# Patient Record
Sex: FEMALE | Race: AMERICAN INDIAN OR ALASKA NATIVE
[De-identification: names, ages, dates, MRNs, and addresses within clinical notes are randomized per-mention and may not be internally consistent; named-entity substitution may affect disease eponyms.]

---

## 2019-08-26 LAB
BASOPHILS # (AUTO): 0 K/MM3 (ref 0–0.1)
BASOPHILS NFR BLD AUTO: 0.4 % (ref 0–1.8)
EOSINOPHIL # BLD AUTO: 0.1 K/MM3 (ref 0–0.4)
EOSINOPHIL NFR BLD AUTO: 0.8 % (ref 0–4.3)
HCT VFR BLD CALC: 40 % (ref 30.3–42.9)
HGB BLD-MCNC: 13.3 GM/DL (ref 10.1–14.3)
LYMPHOCYTES # BLD AUTO: 1.6 K/MM3 (ref 1.2–5.4)
LYMPHOCYTES NFR BLD AUTO: 21.6 % (ref 13.4–35)
MCHC RBC AUTO-ENTMCNC: 33 % (ref 30–34)
MCV RBC AUTO: 89 FL (ref 79–97)
MONOCYTES # (AUTO): 0.4 K/MM3 (ref 0–0.8)
MONOCYTES % (AUTO): 5.2 % (ref 0–7.3)
PLATELET # BLD: 189 K/MM3 (ref 140–440)
RBC # BLD AUTO: 4.52 M/MM3 (ref 3.65–5.03)

## 2019-08-26 NOTE — ANESTHESIA CONSULTATION
Anesthesia Consult and Med Hx


Date of service: 08/26/19





- Airway


Anesthetic Teeth Evaluation: Good, Partials


Mental/Hyoid Distance: Adequate


Mallampati Class: Class II


Intubation Access Assessment: Good





- Pulmonary Exam


CTA: Yes





- Cardiac Exam


Cardiac Exam: RRR





- Pre-Operative Health Status


ASA Pre-Surgery Classification: ASA1


Proposed Anesthetic Plan: General





- Pulmonary


Hx Smoking: No


Hx Asthma: No


COPD: No


Hx Pneumonia: No





- Cardiovascular System


Hx Hypertension: No





- Central Nervous System


Hx Seizures: No


Hx Psychiatric Problems: No





- Endocrine


Hx Renal Disease: No


Hx End Stage Renal Disease: No


Hx Hypothyroidism: No


Hx Hyperthyroidism: No





- Hematic


Hx Anemia: No


Hx Sickle Cell Disease: No





- Other Systems


Hx Alcohol Use: Yes (Occas)


Hx Cancer: No


Hx Obesity: No

## 2019-08-28 ENCOUNTER — HOSPITAL ENCOUNTER (OUTPATIENT)
Dept: HOSPITAL 5 - OR | Age: 34
Setting detail: OBSERVATION
LOS: 1 days | Discharge: HOME | End: 2019-08-29
Attending: OBSTETRICS & GYNECOLOGY | Admitting: OBSTETRICS & GYNECOLOGY
Payer: MEDICAID

## 2019-08-28 DIAGNOSIS — Z98.51: ICD-10-CM

## 2019-08-28 DIAGNOSIS — N94.10: ICD-10-CM

## 2019-08-28 DIAGNOSIS — R10.2: Primary | ICD-10-CM

## 2019-08-28 PROCEDURE — 58552 LAPARO-VAG HYST INCL T/O: CPT

## 2019-08-28 PROCEDURE — 85018 HEMOGLOBIN: CPT

## 2019-08-28 PROCEDURE — 86900 BLOOD TYPING SEROLOGIC ABO: CPT

## 2019-08-28 PROCEDURE — 88307 TISSUE EXAM BY PATHOLOGIST: CPT

## 2019-08-28 PROCEDURE — 84703 CHORIONIC GONADOTROPIN ASSAY: CPT

## 2019-08-28 PROCEDURE — 96376 TX/PRO/DX INJ SAME DRUG ADON: CPT

## 2019-08-28 PROCEDURE — 64450 NJX AA&/STRD OTHER PN/BRANCH: CPT

## 2019-08-28 PROCEDURE — 85025 COMPLETE CBC W/AUTO DIFF WBC: CPT

## 2019-08-28 PROCEDURE — A4217 STERILE WATER/SALINE, 500 ML: HCPCS

## 2019-08-28 PROCEDURE — 85014 HEMATOCRIT: CPT

## 2019-08-28 PROCEDURE — 86901 BLOOD TYPING SEROLOGIC RH(D): CPT

## 2019-08-28 PROCEDURE — 86850 RBC ANTIBODY SCREEN: CPT

## 2019-08-28 PROCEDURE — 96375 TX/PRO/DX INJ NEW DRUG ADDON: CPT

## 2019-08-28 PROCEDURE — 96374 THER/PROPH/DIAG INJ IV PUSH: CPT

## 2019-08-28 PROCEDURE — G0378 HOSPITAL OBSERVATION PER HR: HCPCS

## 2019-08-28 PROCEDURE — 36415 COLL VENOUS BLD VENIPUNCTURE: CPT

## 2019-08-28 RX ADMIN — FENTANYL CITRATE NR MCG: 50 INJECTION, SOLUTION INTRAMUSCULAR; INTRAVENOUS at 08:57

## 2019-08-28 RX ADMIN — DEXTROSE, SODIUM CHLORIDE, SODIUM LACTATE, POTASSIUM CHLORIDE, AND CALCIUM CHLORIDE SCH MLS/HR: 5; .6; .31; .03; .02 INJECTION, SOLUTION INTRAVENOUS at 22:20

## 2019-08-28 RX ADMIN — HYDROMORPHONE HYDROCHLORIDE PRN MG: 1 INJECTION, SOLUTION INTRAMUSCULAR; INTRAVENOUS; SUBCUTANEOUS at 13:20

## 2019-08-28 RX ADMIN — FENTANYL CITRATE NR MCG: 50 INJECTION, SOLUTION INTRAMUSCULAR; INTRAVENOUS at 09:00

## 2019-08-28 RX ADMIN — DEXTROSE, SODIUM CHLORIDE, SODIUM LACTATE, POTASSIUM CHLORIDE, AND CALCIUM CHLORIDE SCH MLS/HR: 5; .6; .31; .03; .02 INJECTION, SOLUTION INTRAVENOUS at 14:52

## 2019-08-28 RX ADMIN — KETOROLAC TROMETHAMINE SCH MG: 30 INJECTION, SOLUTION INTRAMUSCULAR at 17:43

## 2019-08-28 RX ADMIN — HYDROMORPHONE HYDROCHLORIDE PRN MG: 1 INJECTION, SOLUTION INTRAMUSCULAR; INTRAVENOUS; SUBCUTANEOUS at 13:30

## 2019-08-28 NOTE — OPERATIVE REPORT
Operative Report


Operative Report: 


Date of surgery: 2019





Preoperative diagnoses:, Pelvic pain; dyspareunia; dysmenorrhea





Postoperative diagnoses: Same as above





Procedure: Robotic hysterectomy; Bilateral salpingectomy





Surgeon: Phuong Escalera M.D.





Assistant: Katja Monterroso





Anesthesia: Gen. endotracheal anesthesia





Estimated blood loss: 200 mL





Pathology: Uterus, cervix, bilateral tubes





Indication: 34-year-old  with a history of worsening chronic pelvic pain

and dyspareunia.  Patient failed medical management and elected to undergo 

definitive surgical management.





Procedure:


     The patient was taken to the operating room and given general endotracheal 

anesthesia without complication.  She is prepped and draped in a normal sterile 

fashion.  A bivalve speculum was placed in the patient's vagina and a single-

tooth tenaculum placed on the anterior lip of the cervix.  The uterus was 

sounded with the uterine sound.  A V care uterine manipulator was placed in the 

bivalve speculum was then removed.  Attention was then turned to the patient's 

abdomen where a millimeter supra umbilical skin incision was then made.  A 

Veress needle was placed and peritoneal entry was verified water-filled syringe.

 Insufflation of the peritoneal cavity was performed with CO2 gas.  The 12 mm 

trocar was then placed under direct visualization.  An additional 8 mm trocar 

was placed on the patient's left and right lateral side just opposite of the 

supraumbilical trocar.  An additional 5 mm right lateral trocar was then placed 

as the accessory port. In the supraumbilical 12 mm trocar site, the Brandt 

Anderson device was used to place  0 vicryl suture that was secured with a 

hemostat.  The patient was then placed in steep Trendelenburg.  General survey 

normal uterus tubes and ovaries.  The patient had evidence of prior tubal 

ligation with Filshie clips.. The da Van robot was then engaged.  A 

fenestrated forcep was placed in arm 2 and a vessel sealer was placed in arm 1. 

The surgeon then transferred to the surgical console.  The mesosalpinx was then 

isolated on the right.  The vessel sealer was used to coagulate the mesosalpinx 

which  was then transected.  The tube was transected from the ovary.  The tubo-

ovarian ligament was then coagulated and transected.  The round ligament was 

then coagulated and transected also.  The vesicouterine peritoneum was then 

entered from the patient's right side.  The uterine vessels were then coagulated

with the vessel sealer.  The vessels were then transected .  Attention was then 

turned to the patient's left side where the tubo-ovarian ligament and 

mesosalpinx were again isolated coagulated and transected.  The vesical 

peritoneum was then entered from the left and joined in the midline.  Peritoneum

was reflected off of the lower uterine segment.  Uterine vessels were then 

coagulated and then transected.  The blood supply to the uterus was adequately 

contained, a posterior colpotomy was made.  The V care ring was visualized.  

Posterior colpotomy was created with the monopolar scissors.  The incision was 

continued circumferentially until anterior colpotomy was made.  The cervix and 

uterus were amputated from the vaginal cuff.  The uterus was then removed along 

with the tubes bilaterally through the vagina and a warm laparotomy sponge was 

placed and maintain the pneumoperitoneum.  The vaginal cuff was then closed in a

running fashion with V lock suture.  Irrigation of the pelvis was performed.  

Hemoblast was applied to the incision.    The skin was then reapproximated with 

4-0 Monocryl.  The tissue was sent to pathology which included the cervix, 

uterus and tubes.  The patient was then successfully extubated.  She was then 

taken to the recovery room in stable condition.  All sponge laps and needle 

counts were correct x2.

## 2019-08-28 NOTE — HISTORY AND PHYSICAL REPORT
History of Present Illness


Date of examination: 19


Date of admission: 


2019


Chief complaint: 


chronic pelvic pain and dyspareunia





History of present illness: 


33y/o  with a history of dysmenorrhea and dyspareunia. The pelvic 

ultrasound demonstrated no uterine pathology. The patient has attempted medical 

management without significant improvement in her symptoms. 








Past History


Past Medical History: no pertinent history


Past Surgical History: other (tubal ligation)


Social history: single





- Obstetrical History


: 5


Para: 3


Hx # Term Pregnancies: 2


Number of  Pregnancies: 1


Spontaneous Abortions: 1


Induced : 1


Number of Living Children: 4





Medications and Allergies


                                    Allergies











Allergy/AdvReac Type Severity Reaction Status Date / Time


 


No Known Allergies Allergy   Verified 19 17:01











                                Home Medications











 Medication  Instructions  Recorded  Confirmed  Last Taken  Type


 


RX: No Known Home Medications [No  19 Unknown History





Reported Home Medications]     














Review of Systems


All systems: negative


Genitourinary: pelvic pain





- Vital Signs


Vital signs: 


                                   Vital Signs











Temp Pulse Resp BP Pulse Ox


 


 98.1 F   72   18   116/57   98 


 


 19 11:40  19 11:40  19 11:40  19 11:40  19 11:40








                                        











Temp Pulse Resp BP Pulse Ox


 


 98.1 F   72   18   116/57   98 


 


 19 11:40  19 11:40  19 11:40  19 11:40  19 11:40














- Physical Exam


Breasts: Positive: deferred


Cardiovascular: Regular rate


Lungs: Positive: Clear to auscultation


Abdomen: Positive: normal appearance





Results


Result Diagrams: 


                                 19 11:50





All other labs normal.








Assessment and Plan





- Patient Problems


(1) Chronic pelvic pain in female


Status: Acute   





(2) Dyspareunia


Status: Acute   


Plan to address problem: 


will proceed with a robotic hysterectomy

## 2019-08-29 VITALS — SYSTOLIC BLOOD PRESSURE: 107 MMHG | DIASTOLIC BLOOD PRESSURE: 57 MMHG

## 2019-08-29 LAB
HCT VFR BLD CALC: 37 % (ref 30.3–42.9)
HGB BLD-MCNC: 11.9 GM/DL (ref 10.1–14.3)

## 2019-08-29 RX ADMIN — KETOROLAC TROMETHAMINE SCH MG: 30 INJECTION, SOLUTION INTRAMUSCULAR at 11:58

## 2019-08-29 RX ADMIN — OXYCODONE AND ACETAMINOPHEN PRN TAB: 5; 325 TABLET ORAL at 14:33

## 2019-08-29 RX ADMIN — KETOROLAC TROMETHAMINE SCH MG: 30 INJECTION, SOLUTION INTRAMUSCULAR at 05:21

## 2019-08-29 RX ADMIN — OXYCODONE AND ACETAMINOPHEN PRN TAB: 5; 325 TABLET ORAL at 07:35

## 2019-08-29 RX ADMIN — KETOROLAC TROMETHAMINE SCH MG: 30 INJECTION, SOLUTION INTRAMUSCULAR at 18:09

## 2019-08-29 RX ADMIN — KETOROLAC TROMETHAMINE SCH MG: 30 INJECTION, SOLUTION INTRAMUSCULAR at 00:24

## 2019-08-29 RX ADMIN — DEXTROSE, SODIUM CHLORIDE, SODIUM LACTATE, POTASSIUM CHLORIDE, AND CALCIUM CHLORIDE SCH MLS/HR: 5; .6; .31; .03; .02 INJECTION, SOLUTION INTRAVENOUS at 05:34

## 2019-08-29 NOTE — DISCHARGE SUMMARY
Providers





- Providers


Date of Admission: 


08/28/19 12:10





Date of discharge: 08/29/19


Attending physician: 


FERMIN MAN








Hospitalization


Reason for admission: other (chronic pelvic pain)


Procedure: other (robotic hysterectomy )


Incision: normal


Discharge diagnosis: other (chronic pelvic pain)


Hospital course: 


Patient admitted the day of surgery and underwent a robotic hysterectomy. See op

note. Postop uneventful





Condition at discharge: Good


Disposition: DC-01 TO HOME OR SELFCARE





- Discharge Diagnoses


(1) Chronic pelvic pain in female


Status: Acute   





(2) Dyspareunia


Status: Acute   





Plan





- Discharge Medications


Prescriptions: 


Ibuprofen [Motrin] 800 mg PO Q8HR PRN #60 tablet


 PRN Reason: Pain, Mild (1-3)


Oxycodone HCl/Acetaminophen [Percocet 7.5/325 mg] 1 each PO Q6HR PRN #30 tablet


 PRN Reason: Pain





- Provider Discharge Summary


Activity: no sex for 6 weeks, no heavy lifting 4 weeks, no strenuous exercise


Diet: routine


Instructions: routine


Additional instructions: 


[]  Smoking cessation referral if applicable(refer to patient education folder 

for contact #)


[]  Refer to [facility] Booklet








Call your doctor immediately for:


* Fever > 100.5


* Heavy vaginal bleeding ( >1 pad per hour)


* Severe persistent headache


* Shortness of breath


* Reddened, hot, painful area to leg or breast


* Drainage or odor from incision.





* Keep incision clean and dry at all times and follow doctor's instructions 

regarding bathing/showering


schedule followup in 4 weeks








- Follow up plan

## 2019-08-29 NOTE — PROGRESS NOTE
Assessment and Plan





- Patient Problems


(1) Chronic pelvic pain in female


Current Visit: No   Status: Acute   


Plan to address problem: 


patient doing well


routine postop








(2) Dyspareunia


Current Visit: No   Status: Acute   





Subjective





- Subjective


Date of service: 08/29/19


Interval history: 


Patient sitting up in chair. Tolerated a clear diet. Pain is better controlled


Patient reports: appetite normal, pain well controlled





Objective





- Vital Signs


Latest vital signs: 


                                   Vital Signs











  Temp Pulse Resp BP BP Pulse Ox


 


 08/29/19 04:58  98.0 F  69  20  108/72   99


 


 08/28/19 23:18  98.6 F  67  20  114/70   97


 


 08/28/19 19:48  98.5 F  85  20  130/77   99


 


 08/28/19 16:00  97.5 F L  89  18   121/77 


 


 08/28/19 14:25  98.0 F  87  18   114/75  100


 


 08/28/19 13:45   76  14  114/71   100


 


 08/28/19 13:30   78  14  114/76   100


 


 08/28/19 13:15  97.0 F L  75  16  118/76   100


 


 08/28/19 13:00   82  18  124/70   100


 


 08/28/19 12:45   79  16  113/63   100


 


 08/28/19 12:40   79  16  114/60   100


 


 08/28/19 12:35   82  16  113/57   100


 


 08/28/19 12:29  97.3 F L  86  16  111/65   100


 


 08/28/19 10:00    16   


 


 08/28/19 09:57    16   


 


 08/28/19 09:55    16   


 


 08/28/19 09:23   90  18  134/63   99


 


 08/28/19 09:18   93 H  14  118/69   98


 


 08/28/19 09:13   102 H  19  122/60   99


 


 08/28/19 09:08   105 H  17  113/55   99


 


 08/28/19 09:03   75  18  107/64   100


 


 08/28/19 09:00    16   


 


 08/28/19 08:58   87  17  116/66   100


 


 08/28/19 08:57    16   


 


 08/28/19 08:55    16   


 


 08/28/19 08:25  98.4 F  73  18  113/67   98








                                Intake and Output











 08/28/19 08/29/19 08/29/19





 22:59 06:59 14:59


 


Intake Total 864.700 3574.167 


 


Output Total 850 2050 


 


Balance 94.167 -905.833 


 


Intake:   


 


  .167 904.167 


 


    D5lr 1,000 ml @ 125 mls/ 704.167 904.167 





    hr IV AS DIRECT TYLER Rx#:   





    [id number]   


 


  Oral 240 240 


 


Output:   


 


  Urine 850 2050 


 


    Indwelling Catheter 850 1900 


 


    Uretheral (Sorto)  150 


 


Other:   


 


  Total, Intake Amount 120 120 


 


  Total, Output Amount 450 100 


 


  Voiding Method Indwelling Catheter  Toilet














- Exam


Lungs: Present: Clear to auscultation


Abdomen: Present: normal appearance

## 2021-08-13 NOTE — EMERGENCY DEPARTMENT REPORT
ED Eye Problem HPI





- General


Chief complaint: Eye Problems


Stated complaint: LEFT EYE PAIN


Time Seen by Provider: 21 09:32


Source: patient


Mode of arrival: Ambulatory


Limitations: No Limitations





- History of Present Illness


Initial comments: 





36-year-old -American female presents to the emergency room for left eye 

pain since yesterday.  Patient states that she was wearing her contacts longer 

than she usually does and took them out.  Now having runny eyes photophobia 

pain.  Reports her pain is a 7 out of 10.


MD chief complaint: eye pain, eye redness


Onset/Timin


-: days(s)


Onset Description: sudden


Location: left eye


Eye Symptoms: redness, pain, photophobia


Severity scale (0 -10): 7


If Pain, Quality: aching, throbbing


Consistency: constant


Context: contact lens use


Associated Symptoms: none





- Related Data


Patient Tetanus UTD: Yes


                                  Previous Rx's











 Medication  Instructions  Recorded  Last Taken  Type


 


Ibuprofen [Motrin] 800 mg PO Q8HR PRN #60 tablet 19 Unknown Rx


 


Oxycodone HCl/Acetaminophen 1 each PO Q6HR PRN #30 tablet 19 Unknown Rx





[Percocet 7.5/325 mg]    


 


Acetaminophen/Codeine [Tylenol 1 tab PO Q6H PRN #15 tab 21 Unknown Rx





/Codeine # 3 tab]    


 


Erythromycin [Erythromycin Ophth 1 strip OS QID 10 Days #1 tube 21 Unknown

 Rx





Oint]    


 


Ibuprofen [Motrin 800 MG tab] 800 mg PO Q8HR PRN #30 tablet 21 Unknown Rx











                                    Allergies











Allergy/AdvReac Type Severity Reaction Status Date / Time


 


No Known Allergies Allergy   Verified 19 17:01














ED Review of Systems


ROS: 


Stated complaint: LEFT EYE PAIN


Other details as noted in HPI





Comment: All other systems reviewed and negative





ED Past Medical Hx





- Past Medical History


Previous Medical History?: No


Hx Hypertension: No


Hx Congestive Heart Failure: No


Hx Diabetes: No


Hx Deep Vein Thrombosis: No


Hx Renal Disease: No


Hx Sickle Cell Disease: No


Hx Seizures: No


Hx Asthma: No


Hx COPD: No


Hx HIV: No





- Surgical History


Past Surgical History?: Yes


Additional Surgical History: tubal ligation





- Social History


Smoking Status: Never Smoker





- Medications


Home Medications: 


                                Home Medications











 Medication  Instructions  Recorded  Confirmed  Last Taken  Type


 


Ibuprofen [Motrin] 800 mg PO Q8HR PRN #60 tablet 19  Unknown Rx


 


Oxycodone HCl/Acetaminophen 1 each PO Q6HR PRN #30 tablet 19  Unknown Rx





[Percocet 7.5/325 mg]     


 


Acetaminophen/Codeine [Tylenol 1 tab PO Q6H PRN #15 tab 21  Unknown Rx





/Codeine # 3 tab]     


 


Erythromycin [Erythromycin Ophth 1 strip OS QID 10 Days #1 tube 21  

Unknown Rx





Oint]     


 


Ibuprofen [Motrin 800 MG tab] 800 mg PO Q8HR PRN #30 tablet 21  Unknown Rx














ED Physical Exam





- General


Limitations: No Limitations


General appearance: alert, in no apparent distress





- Head


Head exam: Present: atraumatic, normocephalic





- Eye


Eye exam: Present: PERRL, EOMI.  Absent: periorbital swelling, periorbital 

tenderness





- Expanded Eye Exam


  ** Expanded


IOP measured with: other (Fluorescein uptake left eye cornea)





- ENT


ENT exam: Present: normal exam, mucous membranes moist, normal external ear exam





- Neck


Neck exam: Present: normal inspection, full ROM





- Respiratory


Respiratory exam: Present: normal lung sounds bilaterally.  Absent: respiratory 

distress





- Cardiovascular


Cardiovascular Exam: Present: regular rate, normal rhythm.  Absent: systolic 

murmur, diastolic murmur, rubs, gallop





- GI/Abdominal


GI/Abdominal exam: Present: soft, normal bowel sounds





- Extremities Exam


Extremities exam: Present: normal inspection





- Back Exam


Back exam: Present: normal inspection





- Neurological Exam


Neurological exam: Present: alert, oriented X3, normal gait





- Psychiatric


Psychiatric exam: Present: normal affect, normal mood





- Skin


Skin exam: Present: warm, dry, intact, normal color.  Absent: rash





ED Course


                                   Vital Signs











  21





  08:57 09:46


 


Temperature 98.8 F 


 


Pulse Rate 99 H 


 


Respiratory 18 





Rate  


 


Blood Pressure 110/66 





[Right]  


 


O2 Sat by Pulse 97 99





Oximetry  














ED Medical Decision Making





- Medical Decision Making





36-year-old -American female presents to the emergency room for left eye 

pain since yesterday.  Patient states that she was wearing her contacts longer 

than she usually does and took them out.  Now having runny eyes photophobia 

pain.  Reports her pain is a 7 out of 10.








Fluorescein exam shows uptake at the left cornea consistent with a corneal 

abrasion.  We do not have a Vishal-Pen to check for pressure.  I discussed the 

patient pain medication antibiotics for eye and close follow-up with 

ophthalmology is highly recommended.  Suggest wearing sunglasses will help with 

the photophobia.


Critical care attestation.: 


If time is entered above; I have spent that time in minutes in the direct care 

of this critically ill patient, excluding procedure time.








ED Disposition


Clinical Impression: 


 Left corneal abrasion





Disposition:  HOME / SELF CARE / HOMELESS


Is pt being admited?: No


Does the pt Need Aspirin: No


Condition: Stable


Instructions:  Corneal Abrasion, Easy-to-Read


Additional Instructions: 


Please use antibiotic ointment as prescribed.  Pain medication as needed.  Is 

very important that you follow-up with ophthalmology in the next 24 hours.  I 

have listed their information below for your convenience.


Prescriptions: 


Erythromycin [Erythromycin Ophth Oint] 1 strip OS QID 10 Days #1 tube


Ibuprofen [Motrin 800 MG tab] 800 mg PO Q8HR PRN #30 tablet


 PRN Reason: Pain , Severe (7-10)


Acetaminophen/Codeine [Tylenol /Codeine # 3 tab] 1 tab PO Q6H PRN #15 tab


 PRN Reason: Pain , Severe (7-10)


Referrals: 


PRIMARY CARE,MD [Primary Care Provider] - 3-5 Days


IHSAN MCKEON MD [Staff Physician] - 3-5 Days


Forms:  Work/School Release Form(ED)


Time of Disposition: 10:26